# Patient Record
Sex: FEMALE | Race: BLACK OR AFRICAN AMERICAN | NOT HISPANIC OR LATINO | Employment: OTHER | URBAN - METROPOLITAN AREA
[De-identification: names, ages, dates, MRNs, and addresses within clinical notes are randomized per-mention and may not be internally consistent; named-entity substitution may affect disease eponyms.]

---

## 2018-11-26 PROBLEM — I10 HYPERTENSION: Status: ACTIVE | Noted: 2018-11-26

## 2018-11-26 PROBLEM — D64.9 ANEMIA: Status: ACTIVE | Noted: 2018-11-26

## 2018-11-26 PROBLEM — Z00.00 REGULAR CHECK-UP: Status: ACTIVE | Noted: 2018-11-26

## 2018-11-26 PROBLEM — E78.5 HYPERLIPIDEMIA: Status: ACTIVE | Noted: 2018-11-26

## 2019-02-25 PROBLEM — Z00.00 REGULAR CHECK-UP: Status: RESOLVED | Noted: 2018-11-26 | Resolved: 2019-02-25

## 2021-02-13 ENCOUNTER — IMMUNIZATION (OUTPATIENT)
Dept: PRIMARY CARE CLINIC | Facility: CLINIC | Age: 66
End: 2021-02-13
Payer: MEDICARE

## 2021-02-13 DIAGNOSIS — Z23 NEED FOR VACCINATION: Primary | ICD-10-CM

## 2021-02-13 PROCEDURE — 91300 COVID-19, MRNA, LNP-S, PF, 30 MCG/0.3 ML DOSE VACCINE: CPT | Mod: PBBFAC | Performed by: INTERNAL MEDICINE

## 2021-03-06 ENCOUNTER — IMMUNIZATION (OUTPATIENT)
Dept: PRIMARY CARE CLINIC | Facility: CLINIC | Age: 66
End: 2021-03-06
Payer: MEDICARE

## 2021-03-06 DIAGNOSIS — Z23 NEED FOR VACCINATION: Primary | ICD-10-CM

## 2021-03-06 PROCEDURE — 91300 COVID-19, MRNA, LNP-S, PF, 30 MCG/0.3 ML DOSE VACCINE: CPT | Mod: PBBFAC | Performed by: INTERNAL MEDICINE

## 2021-03-06 PROCEDURE — 0002A COVID-19, MRNA, LNP-S, PF, 30 MCG/0.3 ML DOSE VACCINE: CPT | Mod: PBBFAC | Performed by: INTERNAL MEDICINE

## 2025-07-14 ENCOUNTER — HOSPITAL ENCOUNTER (EMERGENCY)
Facility: HOSPITAL | Age: 70
Discharge: HOME | End: 2025-07-15
Attending: STUDENT IN AN ORGANIZED HEALTH CARE EDUCATION/TRAINING PROGRAM
Payer: MEDICARE

## 2025-07-14 DIAGNOSIS — M79.603 ARM PAIN: ICD-10-CM

## 2025-07-14 DIAGNOSIS — M79.601 RIGHT ARM PAIN: Primary | ICD-10-CM

## 2025-07-14 LAB
ABSOLUTE EOSINOPHIL (SMH): 0.32 K/UL
ABSOLUTE MONOCYTE (SMH): 0.64 K/UL (ref 0.3–1)
ABSOLUTE NEUTROPHIL COUNT (SMH): 5.8 K/UL (ref 1.8–7.7)
ALBUMIN SERPL-MCNC: 4.1 G/DL (ref 3.5–5.2)
ALP SERPL-CCNC: 52 UNIT/L (ref 55–135)
ALT SERPL-CCNC: 18 UNIT/L (ref 10–44)
ANION GAP (SMH): 6 MMOL/L (ref 8–16)
AST SERPL-CCNC: 20 UNIT/L (ref 10–40)
BASOPHILS # BLD AUTO: 0.03 K/UL
BASOPHILS NFR BLD AUTO: 0.3 %
BILIRUB SERPL-MCNC: 0.2 MG/DL (ref 0.1–1)
BUN SERPL-MCNC: 18 MG/DL (ref 8–23)
CALCIUM SERPL-MCNC: 9.2 MG/DL (ref 8.7–10.5)
CHLORIDE SERPL-SCNC: 105 MMOL/L (ref 95–110)
CO2 SERPL-SCNC: 29 MMOL/L (ref 23–29)
CREAT SERPL-MCNC: 1.1 MG/DL (ref 0.5–1.4)
ERYTHROCYTE [DISTWIDTH] IN BLOOD BY AUTOMATED COUNT: 12.2 % (ref 11.5–14.5)
GFR SERPLBLD CREATININE-BSD FMLA CKD-EPI: 54 ML/MIN/1.73/M2
GLUCOSE SERPL-MCNC: 120 MG/DL (ref 70–110)
HCT VFR BLD AUTO: 37.1 % (ref 37–48.5)
HGB BLD-MCNC: 11.9 GM/DL (ref 12–16)
IMM GRANULOCYTES # BLD AUTO: 0.05 K/UL (ref 0–0.04)
IMM GRANULOCYTES NFR BLD AUTO: 0.6 % (ref 0–0.5)
LYMPHOCYTES # BLD AUTO: 2.01 K/UL (ref 1–4.8)
MCH RBC QN AUTO: 32.5 PG (ref 27–31)
MCHC RBC AUTO-ENTMCNC: 32.1 G/DL (ref 32–36)
MCV RBC AUTO: 101 FL (ref 82–98)
NUCLEATED RBC (/100WBC) (SMH): 0 /100 WBC
PLATELET # BLD AUTO: 231 K/UL (ref 150–450)
PMV BLD AUTO: 10.1 FL (ref 9.2–12.9)
POTASSIUM SERPL-SCNC: 4 MMOL/L (ref 3.5–5.1)
PROT SERPL-MCNC: 6.4 GM/DL (ref 6–8.4)
RBC # BLD AUTO: 3.66 M/UL (ref 4–5.4)
RELATIVE EOSINOPHIL (SMH): 3.6 % (ref 0–8)
RELATIVE LYMPHOCYTE (SMH): 22.7 % (ref 18–48)
RELATIVE MONOCYTE (SMH): 7.2 % (ref 4–15)
RELATIVE NEUTROPHIL (SMH): 65.6 % (ref 38–73)
SODIUM SERPL-SCNC: 140 MMOL/L (ref 136–145)
TROPONIN HIGH SENSITIVE (SMH): <2.3 PG/ML
WBC # BLD AUTO: 8.86 K/UL (ref 3.9–12.7)

## 2025-07-14 PROCEDURE — 82374 ASSAY BLOOD CARBON DIOXIDE: CPT

## 2025-07-14 PROCEDURE — 93010 ELECTROCARDIOGRAM REPORT: CPT | Mod: ,,, | Performed by: INTERNAL MEDICINE

## 2025-07-14 PROCEDURE — 99285 EMERGENCY DEPT VISIT HI MDM: CPT | Mod: 25

## 2025-07-14 PROCEDURE — 85025 COMPLETE CBC W/AUTO DIFF WBC: CPT

## 2025-07-14 PROCEDURE — 84484 ASSAY OF TROPONIN QUANT: CPT

## 2025-07-14 PROCEDURE — 93005 ELECTROCARDIOGRAM TRACING: CPT | Performed by: INTERNAL MEDICINE

## 2025-07-14 PROCEDURE — 25000003 PHARM REV CODE 250

## 2025-07-14 RX ORDER — NAPROXEN 250 MG/1
500 TABLET ORAL
Status: COMPLETED | OUTPATIENT
Start: 2025-07-15 | End: 2025-07-14

## 2025-07-14 RX ORDER — METHOCARBAMOL 500 MG/1
500 TABLET, FILM COATED ORAL 3 TIMES DAILY
Qty: 15 TABLET | Refills: 0 | Status: SHIPPED | OUTPATIENT
Start: 2025-07-14 | End: 2025-07-19

## 2025-07-14 RX ORDER — METHOCARBAMOL 500 MG/1
500 TABLET, FILM COATED ORAL
Status: COMPLETED | OUTPATIENT
Start: 2025-07-15 | End: 2025-07-14

## 2025-07-14 RX ORDER — NAPROXEN 500 MG/1
500 TABLET ORAL 2 TIMES DAILY WITH MEALS
Qty: 20 TABLET | Refills: 0 | Status: SHIPPED | OUTPATIENT
Start: 2025-07-14 | End: 2025-07-24

## 2025-07-14 RX ADMIN — NAPROXEN 500 MG: 250 TABLET ORAL at 11:07

## 2025-07-14 RX ADMIN — METHOCARBAMOL 500 MG: 500 TABLET ORAL at 11:07

## 2025-07-15 VITALS
DIASTOLIC BLOOD PRESSURE: 73 MMHG | WEIGHT: 146 LBS | SYSTOLIC BLOOD PRESSURE: 171 MMHG | RESPIRATION RATE: 18 BRPM | TEMPERATURE: 99 F | BODY MASS INDEX: 25.87 KG/M2 | OXYGEN SATURATION: 95 % | HEIGHT: 63 IN | HEART RATE: 59 BPM

## 2025-07-15 LAB
OHS QRS DURATION: 90 MS
OHS QTC CALCULATION: 402 MS

## 2025-07-15 NOTE — ED PROVIDER NOTES
Encounter Date: 7/14/2025    I was available for questions, consultation, or evaluation of the patient.  As is protocol in this emergency department, I was given a short summary of the NP or PA's assessment and plan for the patient and the mid-level was comfortable caring for the patient without my evaluation. I was not asked to see this patient by the mid-level. I did not see or evaluate this patient in the ER.         History     Chief Complaint   Patient presents with    Arm Pain     C/o rt arm pain x1.5hr. Denies trauma. Admits to being under stress.      69-year-old female with past medical history of hypertension and hypercholesterolemia presents to the emergency department for right arm pain that began around 5:00 p.m. patient states that she was not doing anything strenuous when she noticed a dull ache in the right arm that starts in the mid upper extremity going down into the forearm.  She is denying any numbness or tingling, chest pain, shortness of breath, radiation elsewhere in the body.  States that she has been under a lot of stress in the last week and wanted to make sure she was not having a heart attack.  Her  is also in the room providing some of the history.  He reports that she has always picking up her grandchild with the right arm and holding them with the right arm.  States she also has slept on that side every night for the last 40 years.  States he does not believe that she is having a heart attack and that this is likely musculoskeletal in that she is just under a lot of stress.  Patient reports that she has had this happened in the past starting from the shoulder and going down into the arm which felt the same.  She was told at the time that it was musculoskeletal.  Patient has no cardiac history and did not take any medications prior to arrival.  She has no risk factors for DVT.        Review of patient's allergies indicates:  No Known Allergies  No past medical history on  file.  No past surgical history on file.  No family history on file.  Social History[1]  Review of Systems   Constitutional: Negative.    Respiratory: Negative.     Cardiovascular: Negative.    Gastrointestinal: Negative.    Musculoskeletal:  Positive for arthralgias and myalgias.   Neurological: Negative.        Physical Exam     Initial Vitals [07/14/25 1944]   BP Pulse Resp Temp SpO2   (!) 154/83 71 15 98.7 °F (37.1 °C) 98 %      MAP       --         Physical Exam    Vitals reviewed.  Constitutional: She appears well-developed and well-nourished. She is not diaphoretic. No distress.   HENT:   Head: Atraumatic. Mouth/Throat: Oropharynx is clear and moist.   Eyes: Conjunctivae and EOM are normal. Right eye exhibits no discharge. Left eye exhibits no discharge.   Neck:   Normal range of motion.  Cardiovascular:  Normal rate, regular rhythm and normal heart sounds.           2+ distal pulses   Pulmonary/Chest: Breath sounds normal. No respiratory distress.   Musculoskeletal:         General: No tenderness or edema. Normal range of motion.      Cervical back: Normal range of motion.      Comments: No swelling, erythema, ecchymosis, tenderness noted to that arm.  Full range of motion with flexion, extension, external rotation in the internal rotation.  No pain against resistance     Neurological: She is alert and oriented to person, place, and time. She has normal strength. No cranial nerve deficit or sensory deficit. GCS score is 15. GCS eye subscore is 4. GCS verbal subscore is 5. GCS motor subscore is 6.   5/5 strength in upper and lower extremities.  No sensory deficits.  Ambulatory gait   Skin: Skin is warm. Capillary refill takes less than 2 seconds.         ED Course   Procedures  Labs Reviewed   COMPREHENSIVE METABOLIC PANEL - Abnormal       Result Value    Sodium 140      Potassium 4.0      Chloride 105      CO2 29      Glucose 120 (*)     BUN 18      Creatinine 1.1      Calcium 9.2      Protein Total 6.4       Albumin 4.1      Bilirubin Total 0.2      ALP 52 (*)     AST 20      ALT 18      Anion Gap 6 (*)     eGFR 54 (*)    CBC WITH DIFFERENTIAL - Abnormal    WBC 8.86      RBC 3.66 (*)     Hgb 11.9 (*)     Hct 37.1       (*)     MCH 32.5 (*)     MCHC 32.1      RDW 12.2      Platelet Count 231      MPV 10.1      Nucleated RBC 0      Neut % 65.6      Lymph % 22.7      Mono % 7.2      Eos % 3.6      Basophil % 0.3      Imm Grans % 0.6 (*)     Neut # 5.8      Lymph # 2.01      Mono # 0.64      Eos # 0.32      Baso # 0.03      Imm Grans # 0.05 (*)    TROPONIN I HIGH SENSITIVITY - Normal    Troponin High Sensitive <2.3     CBC W/ AUTO DIFFERENTIAL    Narrative:     The following orders were created for panel order CBC auto differential.  Procedure                               Abnormality         Status                     ---------                               -----------         ------                     CBC with Differential[[id number]]       Abnormal            Final result                 Please view results for these tests on the individual orders.   EXTRA TUBES    Narrative:     The following orders were created for panel order EXTRA TUBES.  Procedure                               Abnormality         Status                     ---------                               -----------         ------                     Light Blue Top Hold[[id number]]                             In process                 Lavender Top Hold[[id number]]                               In process                   Please view results for these tests on the individual orders.   LIGHT BLUE TOP HOLD   LAVENDER TOP HOLD          Imaging Results              US Upper Extremity Veins Right (Final result)  Result time 07/14/25 23:42:09      Final result by Dmitriy Levi MD (07/14/25 23:42:09)                   Impression:      No thrombus in central veins of the right upper extremity.      Electronically signed by: Dmitriy Levi  MD  Date:    07/14/2025  Time:    23:42               Narrative:    EXAMINATION:  US UPPER EXTREMITY VEINS RIGHT    CLINICAL HISTORY:  Arm pain;    TECHNIQUE:  Duplex and color flow Doppler evaluation and dynamic compression was performed of the right upper extremity veins.    COMPARISON:  None    FINDINGS:  Central veins: The internal jugular, subclavian, and axillary veins are patent and free of thrombus.    Arm veins: The brachial, basilic, and cephalic veins are patent and compressible.    Contralateral subclavian/internal jugular veins: Not evaluated.    Other findings: None.                                       Medications   naproxen tablet 500 mg (500 mg Oral Given 7/14/25 2352)   methocarbamoL tablet 500 mg (500 mg Oral Given 7/14/25 2352)     Medical Decision Making  69-year-old female presents to the emergency department for right arm pain since 5:00 p.m.    Considerations include but not limited to ACS, musculoskeletal injury, DVT    Vitals stable.  Patient afebrile.  Well-appearing on physical exam.  Nontoxic and in no acute distress.  Normal S1, S2.  Vesicular breath sounds throughout.  Normal range of motion in the arm without signs of swelling, erythema, ecchymosis.  Distal pulses are 2+.  No sensory deficits.  I have very low suspicion for DVT as the patient does not meet Wells criteria for scan.  Ultrasound performed which shows no thrombus in the central veins of the right upper extremity.  Remainder of workup is unremarkable with troponin being less than 2.3.  EKG shows normal sinus rhythm with nonspecific T-wave abnormalities no T-wave inversions or signs of ischemia.  Patient will be given a dose of naproxen here in the emergency department as well as a muscle relaxer.  She will be discharged with strict return precautions.  Patient verbalized her understanding and agreed.  I did discuss this case with my attending Dr. Mcneil.  All questions were answered at the bedside.    Amount and/or  Complexity of Data Reviewed  Labs: ordered.    Risk  Prescription drug management.                                          Clinical Impression:  Final diagnoses:  [M79.603] Arm pain  [M79.601] Right arm pain (Primary)          ED Disposition Condition    Discharge Stable          ED Prescriptions       Medication Sig Dispense Start Date End Date Auth. Provider    methocarbamoL (ROBAXIN) 500 MG Tab Take 1 tablet (500 mg total) by mouth 3 (three) times daily. for 5 days 15 tablet 7/14/2025 7/19/2025 Allison Chaudhry PA-C    naproxen (NAPROSYN) 500 MG tablet Take 1 tablet (500 mg total) by mouth 2 (two) times daily with meals. for 10 days 20 tablet 7/14/2025 7/24/2025 Allison Chaudhry PA-C          Follow-up Information       Follow up With Specialties Details Why Contact Info    Duy Soto MD Internal Medicine Call   [address] [facility] [address]  [phone]                   Allison Chaudhry PA-C  07/15/25 0040         [1]   Social History  Tobacco Use    Smoking status: Former    Smokeless tobacco: Never   Substance Use Topics    Alcohol use: No    Drug use: No        Keren Mcneil MD  07/15/25 0120

## 2025-07-15 NOTE — DISCHARGE INSTRUCTIONS
Please take medication as prescribed.  Do not drink or drive while taking this medication.  I have attached some education on the back of your discharge paperwork regarding your diagnosis.  Please return to the emergency department if you develop any worsening symptoms or worsening arm pain accompanied by chest pain, shortness of breath, double or blurry vision, fevers, night sweats, chills.

## 2025-07-21 LAB
OHS QRS DURATION: 90 MS
OHS QTC CALCULATION: 402 MS